# Patient Record
Sex: FEMALE | Race: WHITE | NOT HISPANIC OR LATINO
[De-identification: names, ages, dates, MRNs, and addresses within clinical notes are randomized per-mention and may not be internally consistent; named-entity substitution may affect disease eponyms.]

---

## 2018-08-22 ENCOUNTER — RX ONLY (OUTPATIENT)
Age: 43
Setting detail: RX ONLY
End: 2018-08-22

## 2019-04-10 ENCOUNTER — RX ONLY (OUTPATIENT)
Age: 44
Setting detail: RX ONLY
End: 2019-04-10

## 2020-07-24 ENCOUNTER — RX ONLY (OUTPATIENT)
Age: 45
Setting detail: RX ONLY
End: 2020-07-24

## 2020-10-07 ENCOUNTER — RX ONLY (OUTPATIENT)
Age: 45
Setting detail: RX ONLY
End: 2020-10-07

## 2020-12-11 ENCOUNTER — RX ONLY (OUTPATIENT)
Age: 45
Setting detail: RX ONLY
End: 2020-12-11

## 2021-03-17 ENCOUNTER — RX ONLY (OUTPATIENT)
Age: 46
Setting detail: RX ONLY
End: 2021-03-17

## 2021-05-21 ENCOUNTER — RX ONLY (OUTPATIENT)
Age: 46
Setting detail: RX ONLY
End: 2021-05-21

## 2021-07-06 ENCOUNTER — RX ONLY (OUTPATIENT)
Age: 46
Setting detail: RX ONLY
End: 2021-07-06

## 2021-07-23 ENCOUNTER — ERX REFILL RESPONSE (OUTPATIENT)
Dept: URBAN - METROPOLITAN AREA CLINIC 92 | Facility: CLINIC | Age: 46
End: 2021-07-23

## 2021-07-23 ENCOUNTER — RX ONLY (OUTPATIENT)
Age: 46
Setting detail: RX ONLY
End: 2021-07-23

## 2021-07-23 RX ORDER — OMEPRAZOLE 40 MG/1
TAKE ONE CAPSULE BY MOUTH EVERY MORNING 30 MINUTES BEFORE THE FIRST MEAL OF THE DAY CAPSULE, DELAYED RELEASE ORAL
Qty: 90 CAPSULE | Refills: 3 | OUTPATIENT

## 2021-07-30 ENCOUNTER — ERX REFILL RESPONSE (OUTPATIENT)
Dept: URBAN - METROPOLITAN AREA CLINIC 92 | Facility: CLINIC | Age: 46
End: 2021-07-30

## 2021-07-30 RX ORDER — FAMOTIDINE 20 MG/1
TAKE 2 TABLETS BY MOUTH TWICE DAILY TABLET, FILM COATED ORAL
Qty: 360 TABLET | Refills: 3 | OUTPATIENT

## 2021-08-26 ENCOUNTER — APPOINTMENT (RX ONLY)
Dept: URBAN - METROPOLITAN AREA OTHER 10 | Facility: OTHER | Age: 46
Setting detail: DERMATOLOGY
End: 2021-08-26

## 2021-08-26 DIAGNOSIS — K13.0 DISEASES OF LIPS: ICD-10-CM | Status: INADEQUATELY CONTROLLED

## 2021-08-26 PROCEDURE — ? PRESCRIPTION

## 2021-08-26 PROCEDURE — ? PRESCRIPTION MEDICATION MANAGEMENT

## 2021-08-26 PROCEDURE — ? COUNSELING

## 2021-08-26 PROCEDURE — 99204 OFFICE O/P NEW MOD 45 MIN: CPT

## 2021-08-26 RX ORDER — TRIAMCINOLONE ACETONIDE 0.25 MG/G
CREAM TOPICAL
Qty: 80 | Refills: 2 | Status: ERX | COMMUNITY
Start: 2021-08-26

## 2021-08-26 RX ORDER — KETOCONAZOLE 20 MG/G
CREAM TOPICAL
Qty: 30 | Refills: 2 | Status: ERX | COMMUNITY
Start: 2021-08-26

## 2021-08-26 RX ADMIN — TRIAMCINOLONE ACETONIDE: 0.25 CREAM TOPICAL at 00:00

## 2021-08-26 RX ADMIN — KETOCONAZOLE: 20 CREAM TOPICAL at 00:00

## 2021-08-26 ASSESSMENT — LOCATION ZONE DERM: LOCATION ZONE: LIP

## 2021-08-26 ASSESSMENT — LOCATION SIMPLE DESCRIPTION DERM
LOCATION SIMPLE: RIGHT LIP
LOCATION SIMPLE: LEFT LIP

## 2021-08-26 ASSESSMENT — SEVERITY ASSESSMENT: SEVERITY: MILD TO MODERATE

## 2021-08-26 ASSESSMENT — LOCATION DETAILED DESCRIPTION DERM
LOCATION DETAILED: LEFT INFERIOR VERMILION LIP
LOCATION DETAILED: RIGHT INFERIOR VERMILION LIP

## 2021-08-26 NOTE — PROCEDURE: PRESCRIPTION MEDICATION MANAGEMENT
Render In Strict Bullet Format?: No
Detail Level: Zone
Initiate Treatment: Ketoconazole, mix with Triamcinolone, apply to lips once daily for two weeks, then decrease to apply only on the weekends

## 2021-08-27 ENCOUNTER — APPOINTMENT (RX ONLY)
Dept: URBAN - METROPOLITAN AREA OTHER 10 | Facility: OTHER | Age: 46
Setting detail: DERMATOLOGY
End: 2021-08-27

## 2021-08-27 DIAGNOSIS — K13.0 DISEASES OF LIPS: ICD-10-CM

## 2021-08-27 PROCEDURE — ? OTHER

## 2021-08-27 PROCEDURE — ? REFERRAL

## 2021-08-27 NOTE — PROCEDURE: OTHER
Note Text (......Xxx Chief Complaint.): This diagnosis correlates with the
Detail Level: Zone
Render Risk Assessment In Note?: no
Other (Free Text): Patient was Referred 8-20-21 by Dr. Fung for Angular cheilitis\\nPatient was seen by Dr. Gann\\nCCD will be sent to PCP

## 2022-05-08 ENCOUNTER — TELEPHONE ENCOUNTER (OUTPATIENT)
Dept: URBAN - METROPOLITAN AREA CLINIC 92 | Facility: CLINIC | Age: 47
End: 2022-05-08

## 2022-05-08 ENCOUNTER — ERX REFILL RESPONSE (OUTPATIENT)
Dept: URBAN - METROPOLITAN AREA CLINIC 92 | Facility: CLINIC | Age: 47
End: 2022-05-08

## 2022-05-08 RX ORDER — OMEPRAZOLE 40 MG/1
TAKE ONE CAPSULE BY MOUTH EVERY MORNING 30 MINUTES BEFORE FIRST MEAL OF THE DAY CAPSULE, DELAYED RELEASE ORAL
Qty: 90 CAPSULE | Refills: 1 | OUTPATIENT

## 2022-05-08 RX ORDER — OMEPRAZOLE 40 MG/1
TAKE ONE CAPSULE BY MOUTH EVERY MORNING 30 MINUTES BEFORE THE FIRST MEAL OF THE DAY CAPSULE, DELAYED RELEASE ORAL
Qty: 90 CAPSULE | Refills: 3 | OUTPATIENT

## 2022-06-06 PROBLEM — 235595009 GASTROESOPHAGEAL REFLUX DISEASE: Status: ACTIVE | Noted: 2022-06-06

## 2022-06-06 PROBLEM — 10743008 IRRITABLE BOWEL SYNDROME: Status: ACTIVE | Noted: 2022-06-06

## 2022-06-09 ENCOUNTER — OFFICE VISIT (OUTPATIENT)
Dept: URBAN - METROPOLITAN AREA TELEHEALTH 2 | Facility: TELEHEALTH | Age: 47
End: 2022-06-09
Payer: COMMERCIAL

## 2022-06-09 VITALS — WEIGHT: 255 LBS

## 2022-06-09 DIAGNOSIS — R16.1 SPLENOMEGALY: ICD-10-CM

## 2022-06-09 DIAGNOSIS — K52.9 FOCAL ACTIVE COLITIS: ICD-10-CM

## 2022-06-09 DIAGNOSIS — K21.9 GERD (GASTROESOPHAGEAL REFLUX DISEASE): ICD-10-CM

## 2022-06-09 DIAGNOSIS — K58.9 IBS (IRRITABLE BOWEL SYNDROME): ICD-10-CM

## 2022-06-09 PROCEDURE — 99214 OFFICE O/P EST MOD 30 MIN: CPT | Performed by: INTERNAL MEDICINE

## 2022-06-09 RX ORDER — FAMOTIDINE 20 MG/1
TAKE 2 TABLETS BY MOUTH TWICE DAILY TABLET, FILM COATED ORAL TWICE A DAY
Qty: 360 | Refills: 3

## 2022-06-09 RX ORDER — VILAZODONE HYDROCHLORIDE 40 MG/1
TAKE 1 TABLET (40 MG) BY ORAL ROUTE ONCE DAILY WITH FOOD TABLET ORAL 1
Qty: 0 | Refills: 0 | COMMUNITY
Start: 1900-01-01

## 2022-06-09 RX ORDER — MELOXICAM 15 MG/1
TAKE 1 TABLET (15 MG) BY ORAL ROUTE ONCE DAILY TABLET ORAL 1
Qty: 0 | Refills: 0 | COMMUNITY
Start: 1900-01-01

## 2022-06-09 RX ORDER — TRAZODONE HYDROCHLORIDE 50 MG/1
TABLET ORAL
Qty: 0 | Refills: 0 | COMMUNITY
Start: 1900-01-01

## 2022-06-09 RX ORDER — FAMOTIDINE 20 MG/1
TAKE 2 TABLETS BY MOUTH TWICE DAILY TABLET, FILM COATED ORAL
Qty: 360 TABLET | Refills: 3 | COMMUNITY

## 2022-06-09 RX ORDER — LEVOTHYROXINE SODIUM 125 UG/1
TABLET ORAL
Qty: 0 | Refills: 0 | COMMUNITY
Start: 1900-01-01

## 2022-06-09 RX ORDER — OMEPRAZOLE 40 MG/1
TAKE ONE CAPSULE BY MOUTH EVERY MORNING 30 MINUTES BEFORE FIRST MEAL OF THE DAY CAPSULE, DELAYED RELEASE ORAL ONCE A DAY
Qty: 90 | Refills: 3

## 2022-06-09 RX ORDER — OMEPRAZOLE 40 MG/1
TAKE ONE CAPSULE BY MOUTH EVERY MORNING 30 MINUTES BEFORE FIRST MEAL OF THE DAY CAPSULE, DELAYED RELEASE ORAL
Qty: 90 CAPSULE | Refills: 1 | COMMUNITY

## 2022-06-09 RX ORDER — FLUOXETINE HYDROCHLORIDE 40 MG/1
TAKE 1 CAPSULE (40 MG) BY ORAL ROUTE ONCE DAILY CAPSULE ORAL 1
Qty: 0 | Refills: 0 | COMMUNITY
Start: 1900-01-01

## 2022-06-09 RX ORDER — DIVALPROEX SODIUM 500 MG/1
TAKE 1 TABLET BY ORAL ROUTE 2 TIMES A DAY TABLET, DELAYED RELEASE ORAL 2
Qty: 0 | Refills: 0 | COMMUNITY
Start: 1900-01-01

## 2022-06-09 RX ORDER — PREGABALIN 150 MG
CAPSULE ORAL
Qty: 0 | Refills: 0 | COMMUNITY
Start: 1900-01-01

## 2022-06-09 NOTE — HPI-TODAY'S VISIT:
wt incr 25# over 2y (was 230)   h/o GERD on PPI   Denies abd pain N/V diarrhea constipation hematochezia melena jaundice unintentional wt loss   Denies dysphagia odynophagia food impaction CP cough anorexia light headedness   Denies scleral icterus, increased abd girth, LE edema, confusion, disorientation, memory loss, hematemesis  only has syx (dyspepsia htbrn ) if she misses a dose  Prior EGD/colon DR. CARVER 2019 GERD focal active colitis

## 2024-03-26 ENCOUNTER — LAB (OUTPATIENT)
Dept: URBAN - METROPOLITAN AREA CLINIC 74 | Facility: CLINIC | Age: 49
End: 2024-03-26

## 2024-03-26 ENCOUNTER — OV EP (OUTPATIENT)
Dept: URBAN - METROPOLITAN AREA CLINIC 74 | Facility: CLINIC | Age: 49
End: 2024-03-26
Payer: COMMERCIAL

## 2024-03-26 VITALS
HEIGHT: 66 IN | BODY MASS INDEX: 42.23 KG/M2 | DIASTOLIC BLOOD PRESSURE: 84 MMHG | HEART RATE: 80 BPM | SYSTOLIC BLOOD PRESSURE: 126 MMHG | TEMPERATURE: 97.3 F | WEIGHT: 262.8 LBS | OXYGEN SATURATION: 93 %

## 2024-03-26 DIAGNOSIS — K21.9 GERD (GASTROESOPHAGEAL REFLUX DISEASE): ICD-10-CM

## 2024-03-26 DIAGNOSIS — R16.1 SPLENOMEGALY: ICD-10-CM

## 2024-03-26 DIAGNOSIS — K58.9 IBS (IRRITABLE BOWEL SYNDROME): ICD-10-CM

## 2024-03-26 DIAGNOSIS — A05.8 E. COLI COLITIS: ICD-10-CM

## 2024-03-26 DIAGNOSIS — K64.0 GRADE I INTERNAL HEMORRHOIDS: ICD-10-CM

## 2024-03-26 DIAGNOSIS — Z12.11 SCREENING FOR COLON CANCER: ICD-10-CM

## 2024-03-26 PROCEDURE — 99204 OFFICE O/P NEW MOD 45 MIN: CPT | Performed by: INTERNAL MEDICINE

## 2024-03-26 RX ORDER — LITHIUM 8 MEQ/5ML
5 ML AT BEDTIME SOLUTION ORAL ONCE A DAY
Status: ACTIVE | COMMUNITY

## 2024-03-26 RX ORDER — OMEPRAZOLE 40 MG/1
TAKE ONE CAPSULE BY MOUTH EVERY MORNING 30 MINUTES BEFORE FIRST MEAL OF THE DAY CAPSULE, DELAYED RELEASE ORAL ONCE A DAY
Qty: 90 | Refills: 3 | Status: ACTIVE | COMMUNITY

## 2024-03-26 RX ORDER — FAMOTIDINE 40 MG/1
1 TABLET TABLET, FILM COATED ORAL ONCE A DAY
Qty: 90 TABLET | Refills: 3

## 2024-03-26 RX ORDER — FLUOXETINE HYDROCHLORIDE 40 MG/1
TAKE 1 CAPSULE (40 MG) BY ORAL ROUTE ONCE DAILY CAPSULE ORAL 1
Qty: 0 | Refills: 0 | Status: ON HOLD | COMMUNITY
Start: 1900-01-01

## 2024-03-26 RX ORDER — BUPROPION HCL 100 %
AS DIRECTED POWDER (GRAM) MISCELLANEOUS
Status: ACTIVE | COMMUNITY

## 2024-03-26 RX ORDER — PREGABALIN 150 MG
CAPSULE ORAL
Qty: 0 | Refills: 0 | Status: ACTIVE | COMMUNITY
Start: 1900-01-01

## 2024-03-26 RX ORDER — VILAZODONE HYDROCHLORIDE 40 MG/1
TAKE 1 TABLET (40 MG) BY ORAL ROUTE ONCE DAILY WITH FOOD TABLET ORAL 1
Qty: 0 | Refills: 0 | Status: ACTIVE | COMMUNITY
Start: 1900-01-01

## 2024-03-26 RX ORDER — OMEPRAZOLE 40 MG/1
TAKE ONE CAPSULE BY MOUTH EVERY MORNING 30 MINUTES BEFORE FIRST MEAL OF THE DAY CAPSULE, DELAYED RELEASE ORAL ONCE A DAY
Qty: 90 | Refills: 3

## 2024-03-26 RX ORDER — MELOXICAM 15 MG/1
TAKE 1 TABLET (15 MG) BY ORAL ROUTE ONCE DAILY TABLET ORAL 1
Qty: 0 | Refills: 0 | Status: ON HOLD | COMMUNITY
Start: 1900-01-01

## 2024-03-26 RX ORDER — FAMOTIDINE 20 MG/1
TAKE 2 TABLETS BY MOUTH TWICE DAILY TABLET, FILM COATED ORAL TWICE A DAY
Qty: 360 | Refills: 3 | Status: ACTIVE | COMMUNITY

## 2024-03-26 RX ORDER — DIVALPROEX SODIUM 500 MG/1
TAKE 1 TABLET BY ORAL ROUTE 2 TIMES A DAY TABLET, DELAYED RELEASE ORAL 2
Qty: 0 | Refills: 0 | Status: ACTIVE | COMMUNITY
Start: 1900-01-01

## 2024-03-26 RX ORDER — LEVOTHYROXINE SODIUM 150 UG/1
1 TABLET IN THE MORNING ON AN EMPTY STOMACH TABLET ORAL ONCE A DAY
Refills: 0 | Status: ACTIVE | COMMUNITY
Start: 1900-01-01

## 2024-03-26 RX ORDER — TRAZODONE HYDROCHLORIDE 50 MG/1
TABLET ORAL
Qty: 0 | Refills: 0 | Status: ON HOLD | COMMUNITY
Start: 1900-01-01

## 2024-03-26 NOTE — HPI-TODAY'S VISIT:
Kristen is a 48-year-old CF with a complex medical history, presents for a routine consultation. They report a history of gastroesophageal reflux disease (GERD), irritable bowel syndrome (IBS), fibromyalgia, Sjogren's syndrome, and bipolar disorder. The patient is seeking medication refills. Needs screening colonoscopy The patient describes their reflux as being well-controlled with a regimen of daily Omeprazole and twice-daily famotidine. However, they express concerns about the long-term use of these medications. The patient's bowel habits have recently included an episode of constipation, which resulted in the development of an external hemorrhoid. Although the hemorrhoid was bleeding, it has since ceased. To manage their bowel movements, the patient utilizes stool softeners and maintains adequate hydration, expressing willingness to consider Miralax in the future. Regarding previous diagnostic procedures, the patient underwent a colonoscopy five years prior due to abdominal pain, which revealed internal hemorrhoids. They have never been found to have polyps in their colon.

## 2024-04-18 ENCOUNTER — LAB (OUTPATIENT)
Dept: URBAN - METROPOLITAN AREA CLINIC 4 | Facility: CLINIC | Age: 49
End: 2024-04-18
Payer: COMMERCIAL

## 2024-04-18 ENCOUNTER — COLON (OUTPATIENT)
Dept: URBAN - METROPOLITAN AREA SURGERY CENTER 30 | Facility: SURGERY CENTER | Age: 49
End: 2024-04-18
Payer: COMMERCIAL

## 2024-04-18 DIAGNOSIS — D12.2 ADENOMA OF ASCENDING COLON: ICD-10-CM

## 2024-04-18 DIAGNOSIS — D12.2 BENIGN NEOPLASM OF ASCENDING COLON: ICD-10-CM

## 2024-04-18 DIAGNOSIS — Z12.11 COLON CANCER SCREENING: ICD-10-CM

## 2024-04-18 PROCEDURE — 45380 COLONOSCOPY AND BIOPSY: CPT | Performed by: INTERNAL MEDICINE

## 2024-04-18 PROCEDURE — 88305 TISSUE EXAM BY PATHOLOGIST: CPT | Performed by: PATHOLOGY

## 2024-05-02 ENCOUNTER — WEB ENCOUNTER (OUTPATIENT)
Dept: URBAN - METROPOLITAN AREA CLINIC 74 | Facility: CLINIC | Age: 49
End: 2024-05-02

## 2024-05-02 RX ORDER — FAMOTIDINE 40 MG/1
1 TABLET TABLET, FILM COATED ORAL ONCE A DAY
Qty: 90 TABLET | Refills: 3

## 2024-05-02 RX ORDER — OMEPRAZOLE 40 MG/1
TAKE ONE CAPSULE BY MOUTH EVERY MORNING 30 MINUTES BEFORE FIRST MEAL OF THE DAY CAPSULE, DELAYED RELEASE ORAL ONCE A DAY
Qty: 90 | Refills: 3

## 2024-07-18 ENCOUNTER — DASHBOARD ENCOUNTERS (OUTPATIENT)
Age: 49
End: 2024-07-18

## 2024-07-22 ENCOUNTER — OFFICE VISIT (OUTPATIENT)
Dept: URBAN - METROPOLITAN AREA CLINIC 74 | Facility: CLINIC | Age: 49
End: 2024-07-22

## 2024-07-22 RX ORDER — PREGABALIN 150 MG
CAPSULE ORAL
Qty: 0 | Refills: 0 | Status: ACTIVE | COMMUNITY
Start: 1900-01-01

## 2024-07-22 RX ORDER — OMEPRAZOLE 40 MG/1
TAKE ONE CAPSULE BY MOUTH EVERY MORNING 30 MINUTES BEFORE FIRST MEAL OF THE DAY CAPSULE, DELAYED RELEASE ORAL ONCE A DAY
Qty: 90 | Refills: 3

## 2024-07-22 RX ORDER — BUPROPION HCL 100 %
AS DIRECTED POWDER (GRAM) MISCELLANEOUS
Status: ACTIVE | COMMUNITY

## 2024-07-22 RX ORDER — LITHIUM 8 MEQ/5ML
5 ML AT BEDTIME SOLUTION ORAL ONCE A DAY
Status: ACTIVE | COMMUNITY

## 2024-07-22 RX ORDER — LEVOTHYROXINE SODIUM 150 UG/1
1 TABLET IN THE MORNING ON AN EMPTY STOMACH TABLET ORAL ONCE A DAY
Refills: 0 | Status: ACTIVE | COMMUNITY
Start: 1900-01-01

## 2024-07-22 RX ORDER — VILAZODONE HYDROCHLORIDE 40 MG/1
TAKE 1 TABLET (40 MG) BY ORAL ROUTE ONCE DAILY WITH FOOD TABLET ORAL 1
Qty: 0 | Refills: 0 | Status: ACTIVE | COMMUNITY
Start: 1900-01-01

## 2024-07-22 RX ORDER — OMEPRAZOLE 40 MG/1
TAKE ONE CAPSULE BY MOUTH EVERY MORNING 30 MINUTES BEFORE FIRST MEAL OF THE DAY CAPSULE, DELAYED RELEASE ORAL ONCE A DAY
Qty: 90 | Refills: 3 | Status: ACTIVE | COMMUNITY

## 2024-07-22 RX ORDER — FAMOTIDINE 40 MG/1
1 TABLET TABLET, FILM COATED ORAL ONCE A DAY
Qty: 90 TABLET | Refills: 3

## 2024-07-22 RX ORDER — DIVALPROEX SODIUM 500 MG/1
TAKE 1 TABLET BY ORAL ROUTE 2 TIMES A DAY TABLET, DELAYED RELEASE ORAL 2
Qty: 0 | Refills: 0 | Status: ACTIVE | COMMUNITY
Start: 1900-01-01

## 2024-07-22 RX ORDER — FAMOTIDINE 40 MG/1
1 TABLET TABLET, FILM COATED ORAL ONCE A DAY
Qty: 90 TABLET | Refills: 3 | Status: ACTIVE | COMMUNITY

## 2024-08-13 ENCOUNTER — OFFICE VISIT (OUTPATIENT)
Dept: URBAN - METROPOLITAN AREA CLINIC 74 | Facility: CLINIC | Age: 49
End: 2024-08-13
Payer: COMMERCIAL

## 2024-08-13 VITALS
DIASTOLIC BLOOD PRESSURE: 78 MMHG | TEMPERATURE: 97.5 F | BODY MASS INDEX: 43.04 KG/M2 | OXYGEN SATURATION: 97 % | WEIGHT: 267.8 LBS | HEIGHT: 66 IN | HEART RATE: 75 BPM | SYSTOLIC BLOOD PRESSURE: 110 MMHG

## 2024-08-13 DIAGNOSIS — K58.8 OTHER IRRITABLE BOWEL SYNDROME: ICD-10-CM

## 2024-08-13 DIAGNOSIS — K21.9 GERD (GASTROESOPHAGEAL REFLUX DISEASE): ICD-10-CM

## 2024-08-13 PROCEDURE — 99214 OFFICE O/P EST MOD 30 MIN: CPT | Performed by: INTERNAL MEDICINE

## 2024-08-13 RX ORDER — LEVOTHYROXINE SODIUM 150 UG/1
1 TABLET IN THE MORNING ON AN EMPTY STOMACH TABLET ORAL ONCE A DAY
Refills: 0 | Status: ACTIVE | COMMUNITY
Start: 1900-01-01

## 2024-08-13 RX ORDER — OMEPRAZOLE 40 MG/1
TAKE ONE CAPSULE BY MOUTH EVERY MORNING 30 MINUTES BEFORE FIRST MEAL OF THE DAY CAPSULE, DELAYED RELEASE ORAL ONCE A DAY
Qty: 90 | Refills: 3

## 2024-08-13 RX ORDER — VILAZODONE HYDROCHLORIDE 40 MG/1
TAKE 1 TABLET (40 MG) BY ORAL ROUTE ONCE DAILY WITH FOOD TABLET ORAL 1
Qty: 0 | Refills: 0 | Status: ACTIVE | COMMUNITY
Start: 1900-01-01

## 2024-08-13 RX ORDER — FAMOTIDINE 40 MG/1
1 TABLET TABLET, FILM COATED ORAL ONCE A DAY
Qty: 90 TABLET | Refills: 3 | Status: ACTIVE | COMMUNITY

## 2024-08-13 RX ORDER — OMEPRAZOLE 40 MG/1
TAKE ONE CAPSULE BY MOUTH EVERY MORNING 30 MINUTES BEFORE FIRST MEAL OF THE DAY CAPSULE, DELAYED RELEASE ORAL ONCE A DAY
Qty: 90 | Refills: 3 | Status: ACTIVE | COMMUNITY

## 2024-08-13 RX ORDER — LITHIUM 8 MEQ/5ML
5 ML AT BEDTIME SOLUTION ORAL ONCE A DAY
Status: ACTIVE | COMMUNITY

## 2024-08-13 RX ORDER — FAMOTIDINE 40 MG/1
1 TABLET TABLET, FILM COATED ORAL ONCE A DAY
Qty: 90 TABLET | Refills: 3

## 2024-08-13 RX ORDER — DIVALPROEX SODIUM 500 MG/1
TAKE 1 TABLET BY ORAL ROUTE 2 TIMES A DAY TABLET, DELAYED RELEASE ORAL 2
Qty: 0 | Refills: 0 | Status: ACTIVE | COMMUNITY
Start: 1900-01-01

## 2024-08-13 RX ORDER — BUPROPION HCL 100 %
AS DIRECTED POWDER (GRAM) MISCELLANEOUS
Status: ACTIVE | COMMUNITY

## 2024-08-13 RX ORDER — PREGABALIN 150 MG
CAPSULE ORAL
Qty: 0 | Refills: 0 | Status: ACTIVE | COMMUNITY
Start: 1900-01-01

## 2024-08-13 NOTE — HPI-TODAY'S VISIT:
Th is a 49-year-old CF with a complex medical history, presents for f/u. They report a history of gastroesophageal reflux disease (GERD), irritable bowel syndrome (IBS), fibromyalgia, Sjogren's syndrome, and bipolar disorder. The patient is seeking medication refills. recent colonosopy from 4/24-small tubular adenoma/diverticulosis/internal hemorrhoids The patient describes their reflux as being well-controlled with a regimen of daily Omeprazole and twice-daily famotidine. has chronic constipation that is unresponsive to senna/dulcolax/miralax.

## 2024-10-31 PROBLEM — 440630006: Status: ACTIVE | Noted: 2024-10-31

## 2024-10-31 PROBLEM — 82934008: Status: ACTIVE | Noted: 2024-10-31

## 2024-11-13 ENCOUNTER — LAB OUTSIDE AN ENCOUNTER (OUTPATIENT)
Dept: URBAN - METROPOLITAN AREA CLINIC 74 | Facility: CLINIC | Age: 49
End: 2024-11-13

## 2024-11-13 ENCOUNTER — OFFICE VISIT (OUTPATIENT)
Dept: URBAN - METROPOLITAN AREA CLINIC 74 | Facility: CLINIC | Age: 49
End: 2024-11-13
Payer: COMMERCIAL

## 2024-11-13 VITALS
OXYGEN SATURATION: 99 % | TEMPERATURE: 98.7 F | HEART RATE: 72 BPM | BODY MASS INDEX: 43.3 KG/M2 | DIASTOLIC BLOOD PRESSURE: 72 MMHG | SYSTOLIC BLOOD PRESSURE: 102 MMHG | WEIGHT: 269.4 LBS | HEIGHT: 66 IN

## 2024-11-13 DIAGNOSIS — K57.30 DIVERTICULOSIS OF COLON WITHOUT DIVERTICULITIS: ICD-10-CM

## 2024-11-13 DIAGNOSIS — R10.84 DIFFUSE ABDOMINAL PAIN: ICD-10-CM

## 2024-11-13 DIAGNOSIS — K58.1 IRRITABLE BOWEL SYNDROME WITH CONSTIPATION: ICD-10-CM

## 2024-11-13 DIAGNOSIS — D12.6 TUBULAR ADENOMA OF COLON: ICD-10-CM

## 2024-11-13 DIAGNOSIS — K59.04 CHRONIC IDIOPATHIC CONSTIPATION: ICD-10-CM

## 2024-11-13 DIAGNOSIS — K29.50 MILD CHRONIC GASTRITIS: ICD-10-CM

## 2024-11-13 DIAGNOSIS — K21.9 CHRONIC GERD WITHOUT ESOPHAGITIS: ICD-10-CM

## 2024-11-13 PROBLEM — 8493009: Status: ACTIVE | Noted: 2024-11-13

## 2024-11-13 PROBLEM — 398311004: Status: ACTIVE | Noted: 2024-11-13

## 2024-11-13 PROCEDURE — 99214 OFFICE O/P EST MOD 30 MIN: CPT | Performed by: PHYSICIAN ASSISTANT

## 2024-11-13 RX ORDER — DIVALPROEX SODIUM 500 MG/1
TAKE 1 TABLET BY ORAL ROUTE 2 TIMES A DAY TABLET, DELAYED RELEASE ORAL 2
Qty: 0 | Refills: 0 | Status: ON HOLD | COMMUNITY
Start: 1900-01-01

## 2024-11-13 RX ORDER — OMEPRAZOLE 40 MG/1
TAKE ONE CAPSULE BY MOUTH EVERY MORNING 30 MINUTES BEFORE FIRST MEAL OF THE DAY CAPSULE, DELAYED RELEASE ORAL ONCE A DAY
Qty: 90 | Refills: 3 | Status: ACTIVE | COMMUNITY

## 2024-11-13 RX ORDER — LITHIUM 8 MEQ/5ML
5 ML AT BEDTIME SOLUTION ORAL ONCE A DAY
Status: ACTIVE | COMMUNITY

## 2024-11-13 RX ORDER — BUPROPION HCL 100 %
AS DIRECTED POWDER (GRAM) MISCELLANEOUS
Status: ACTIVE | COMMUNITY

## 2024-11-13 RX ORDER — VILAZODONE HYDROCHLORIDE 40 MG/1
TAKE 1 TABLET (40 MG) BY ORAL ROUTE ONCE DAILY WITH FOOD TABLET ORAL 1
Qty: 0 | Refills: 0 | Status: ACTIVE | COMMUNITY
Start: 1900-01-01

## 2024-11-13 RX ORDER — FAMOTIDINE 40 MG/1
1 TABLET TABLET, FILM COATED ORAL ONCE A DAY
Qty: 90 TABLET | Refills: 3 | Status: ACTIVE | COMMUNITY

## 2024-11-13 RX ORDER — LEVOTHYROXINE SODIUM 150 UG/1
1 TABLET IN THE MORNING ON AN EMPTY STOMACH TABLET ORAL ONCE A DAY
Refills: 0 | Status: ACTIVE | COMMUNITY
Start: 1900-01-01

## 2024-11-13 RX ORDER — PREGABALIN 150 MG
CAPSULE ORAL
Qty: 0 | Refills: 0 | Status: ON HOLD | COMMUNITY
Start: 1900-01-01

## 2024-11-13 NOTE — HPI-TODAY'S VISIT:
The patient is a 49-year-old female with past medical history as noted below known to Dr. Weir is presenting to our clinic today for follow-up appointment with known history of constipation and GERD.  She was given sample of Linzess 72 mcg last visit for constipation.  She is currently on Omeprazole 40 mg 1 tablet in a.m. and Famotidine 40 mg 1 tablet at bedtime for history of GERD. She is moving her bowels once per week. She has also been trying Miralax with poor response.   Procedures: -- Colonoscopy with polypectomy on 04/18/2024 by Dr. Weir noted one 6 mm polyp in the ascending colon, removed with cold biopsy forceps.  Resected and retrieved.  Diverticulosis in sigmoid colon and the descending colon.  Non- bleeding internal hemorrhoids.  Repeat colonoscopy in 5 years for surveillance.  Biopsy with tubular adenoma colon polyps.  -- EGD with biopsy on 01/09/2024 by Dr. Kim noted LA grade B reflux esophagitis.  Erythema, eroded and granular mucosa in the gastric body, antrum polyp prepyloric region of the stomach and pylorus.  Mucosal changes in the duodenum.  Biopsy chronic gastritis.  No H.pylori organism.  No intestinal metaplasia.

## 2024-12-12 ENCOUNTER — OFFICE VISIT (OUTPATIENT)
Dept: URBAN - METROPOLITAN AREA CLINIC 74 | Facility: CLINIC | Age: 49
End: 2024-12-12
Payer: COMMERCIAL

## 2024-12-12 VITALS
BODY MASS INDEX: 43.78 KG/M2 | WEIGHT: 272.4 LBS | SYSTOLIC BLOOD PRESSURE: 116 MMHG | HEIGHT: 66 IN | HEART RATE: 80 BPM | DIASTOLIC BLOOD PRESSURE: 80 MMHG | TEMPERATURE: 98.9 F

## 2024-12-12 DIAGNOSIS — K29.50 MILD CHRONIC GASTRITIS: ICD-10-CM

## 2024-12-12 DIAGNOSIS — K58.1 IRRITABLE BOWEL SYNDROME WITH CONSTIPATION: ICD-10-CM

## 2024-12-12 DIAGNOSIS — R10.84 DIFFUSE ABDOMINAL PAIN: ICD-10-CM

## 2024-12-12 DIAGNOSIS — K21.9 CHRONIC GERD WITHOUT ESOPHAGITIS: ICD-10-CM

## 2024-12-12 DIAGNOSIS — K59.04 CHRONIC IDIOPATHIC CONSTIPATION: ICD-10-CM

## 2024-12-12 DIAGNOSIS — K57.30 DIVERTICULOSIS OF COLON WITHOUT DIVERTICULITIS: ICD-10-CM

## 2024-12-12 PROCEDURE — 99213 OFFICE O/P EST LOW 20 MIN: CPT | Performed by: PHYSICIAN ASSISTANT

## 2024-12-12 RX ORDER — PREGABALIN 150 MG
CAPSULE ORAL
Qty: 0 | Refills: 0 | Status: ON HOLD | COMMUNITY
Start: 1900-01-01

## 2024-12-12 RX ORDER — BUPROPION HCL 100 %
AS DIRECTED POWDER (GRAM) MISCELLANEOUS
Status: ACTIVE | COMMUNITY

## 2024-12-12 RX ORDER — VILAZODONE HYDROCHLORIDE 40 MG/1
TAKE 1 TABLET (40 MG) BY ORAL ROUTE ONCE DAILY WITH FOOD TABLET ORAL 1
Qty: 0 | Refills: 0 | Status: ACTIVE | COMMUNITY
Start: 1900-01-01

## 2024-12-12 RX ORDER — LEVOTHYROXINE SODIUM 150 UG/1
1 TABLET IN THE MORNING ON AN EMPTY STOMACH TABLET ORAL ONCE A DAY
Refills: 0 | Status: ACTIVE | COMMUNITY
Start: 1900-01-01

## 2024-12-12 RX ORDER — OMEPRAZOLE 40 MG/1
TAKE ONE CAPSULE BY MOUTH EVERY MORNING 30 MINUTES BEFORE FIRST MEAL OF THE DAY CAPSULE, DELAYED RELEASE ORAL ONCE A DAY
Qty: 90 | Refills: 3 | Status: ACTIVE | COMMUNITY

## 2024-12-12 RX ORDER — LITHIUM 8 MEQ/5ML
5 ML AT BEDTIME SOLUTION ORAL ONCE A DAY
Status: ACTIVE | COMMUNITY

## 2024-12-12 RX ORDER — DIVALPROEX SODIUM 500 MG/1
TAKE 1 TABLET BY ORAL ROUTE 2 TIMES A DAY TABLET, DELAYED RELEASE ORAL 2
Qty: 0 | Refills: 0 | Status: ON HOLD | COMMUNITY
Start: 1900-01-01

## 2024-12-12 RX ORDER — FAMOTIDINE 40 MG/1
1 TABLET TABLET, FILM COATED ORAL ONCE A DAY
Qty: 90 TABLET | Refills: 3 | Status: ACTIVE | COMMUNITY

## 2024-12-12 NOTE — HPI-TODAY'S VISIT:
The patient is a 49-year-old female with past medical history as noted below known to Dr. Weir is presenting to our clinic today for follow-up appointment. She forgot to do her CT scan. She is currently on Omeprazole 40 mg 1 tablet in a.m and Famotidine 40 mg 1 tablet at bedtime for history of GERD. She was given sample of Linzess 290 mcg last visit for constipation. She states that Linzess 290 mcg was not helping with her bowel movements. She has started back on Miralax with bowel movement every 3 days.   Procedures: -- Colonoscopy with polypectomy on 04/18/2024 by Dr. Weir noted one 6 mm polyp in the ascending colon, removed with cold biopsy forceps.  Resected and retrieved.  Diverticulosis in sigmoid colon and the descending colon.  Non- bleeding internal hemorrhoids.  Repeat colonoscopy in 5 years for surveillance.  Biopsy with tubular adenoma colon polyps.  -- EGD with biopsy on 01/09/2024 by Dr. Kim noted LA grade B reflux esophagitis.  Erythema, eroded and granular mucosa in the gastric body, antrum polyp prepyloric region of the stomach and pylorus.  Mucosal changes in the duodenum.  Biopsy chronic gastritis.  No H.pylori organism.  No intestinal metaplasia.

## 2024-12-18 PROBLEM — 197321007: Status: ACTIVE | Noted: 2024-12-18

## 2025-01-16 ENCOUNTER — OFFICE VISIT (OUTPATIENT)
Dept: URBAN - METROPOLITAN AREA CLINIC 74 | Facility: CLINIC | Age: 50
End: 2025-01-16
Payer: COMMERCIAL

## 2025-01-16 VITALS
TEMPERATURE: 99.3 F | BODY MASS INDEX: 44.48 KG/M2 | DIASTOLIC BLOOD PRESSURE: 80 MMHG | SYSTOLIC BLOOD PRESSURE: 102 MMHG | HEART RATE: 94 BPM | WEIGHT: 276.8 LBS | HEIGHT: 66 IN

## 2025-01-16 DIAGNOSIS — K76.0 HEPATIC STEATOSIS: ICD-10-CM

## 2025-01-16 DIAGNOSIS — K59.04 CHRONIC IDIOPATHIC CONSTIPATION: ICD-10-CM

## 2025-01-16 DIAGNOSIS — K21.9 CHRONIC GERD WITHOUT ESOPHAGITIS: ICD-10-CM

## 2025-01-16 DIAGNOSIS — K58.1 IRRITABLE BOWEL SYNDROME WITH CONSTIPATION: ICD-10-CM

## 2025-01-16 DIAGNOSIS — K29.50 MILD CHRONIC GASTRITIS: ICD-10-CM

## 2025-01-16 PROCEDURE — 99214 OFFICE O/P EST MOD 30 MIN: CPT | Performed by: PHYSICIAN ASSISTANT

## 2025-01-16 RX ORDER — FAMOTIDINE 40 MG/1
1 TABLET TABLET, FILM COATED ORAL ONCE A DAY
Qty: 90 TABLET | Refills: 3

## 2025-01-16 RX ORDER — PREGABALIN 150 MG
CAPSULE ORAL
Qty: 0 | Refills: 0 | Status: ON HOLD | COMMUNITY
Start: 1900-01-01

## 2025-01-16 RX ORDER — VILAZODONE HYDROCHLORIDE 40 MG/1
TAKE 1 TABLET (40 MG) BY ORAL ROUTE ONCE DAILY WITH FOOD TABLET ORAL 1
Qty: 0 | Refills: 0 | Status: ACTIVE | COMMUNITY
Start: 1900-01-01

## 2025-01-16 RX ORDER — FAMOTIDINE 40 MG/1
1 TABLET TABLET, FILM COATED ORAL ONCE A DAY
Qty: 90 TABLET | Refills: 3 | Status: ACTIVE | COMMUNITY

## 2025-01-16 RX ORDER — OMEPRAZOLE 40 MG/1
TAKE ONE CAPSULE BY MOUTH EVERY MORNING 30 MINUTES BEFORE FIRST MEAL OF THE DAY CAPSULE, DELAYED RELEASE ORAL ONCE A DAY
Qty: 90 | Refills: 3

## 2025-01-16 RX ORDER — TENAPANOR HYDROCHLORIDE 53.2 MG/1
1 TABLET IMMEDIATELY BEFORE MEALS TABLET ORAL TWICE A DAY
Qty: 180 TABLET | Refills: 3 | OUTPATIENT
Start: 2025-01-16

## 2025-01-16 RX ORDER — OMEPRAZOLE 40 MG/1
TAKE ONE CAPSULE BY MOUTH EVERY MORNING 30 MINUTES BEFORE FIRST MEAL OF THE DAY CAPSULE, DELAYED RELEASE ORAL ONCE A DAY
Qty: 90 | Refills: 3 | Status: ACTIVE | COMMUNITY

## 2025-01-16 RX ORDER — DIVALPROEX SODIUM 500 MG/1
TAKE 1 TABLET BY ORAL ROUTE 2 TIMES A DAY TABLET, DELAYED RELEASE ORAL 2
Qty: 0 | Refills: 0 | Status: ON HOLD | COMMUNITY
Start: 1900-01-01

## 2025-01-16 RX ORDER — LEVOTHYROXINE SODIUM 150 UG/1
1 TABLET IN THE MORNING ON AN EMPTY STOMACH TABLET ORAL ONCE A DAY
Refills: 0 | Status: ACTIVE | COMMUNITY
Start: 1900-01-01

## 2025-01-16 RX ORDER — BUPROPION HCL 100 %
AS DIRECTED POWDER (GRAM) MISCELLANEOUS
Status: ACTIVE | COMMUNITY

## 2025-01-16 RX ORDER — LITHIUM 8 MEQ/5ML
5 ML AT BEDTIME SOLUTION ORAL ONCE A DAY
Status: ACTIVE | COMMUNITY

## 2025-01-16 NOTE — HPI-TODAY'S VISIT:
The patient is a 49-year-old female with past medical history as noted below known to Dr. Weir is presenting to our clinic today for follow-up appointment to discuss her CT reults. She is currently on Omeprazole 40 mg 1 tablet in am and Famotidine 40 mg 1 tablet at bedtime for history of GERD.She failed therapy with Linzess 290 mcg once daily and she was given samples of Ibsrela 50 mg twice daily. She is doing much better on Ibsrela and she moves her bowels.   Diagnostic studies: -- CT scan of the abdomen and pelvis with IV contrast on 12/17/2024 noted moderate to large colonic stool burden, consistent with chronic constipation.  No acute abnormality of the abdomen or pelvis.  Hepatic steatosis.  Gallbladder, spleen, pancreas, adrenal glands, and kidneys are unremarkable.  Procedures: -- Colonoscopy with polypectomy on 04/18/2024 by Dr. Weir noted one 6 mm polyp in the ascending colon, removed with cold biopsy forceps.  Resected and retrieved.  Diverticulosis in sigmoid colon and the descending colon.  Non- bleeding internal hemorrhoids.  Repeat colonoscopy in 5 years for surveillance.  Biopsy with tubular adenoma colon polyps.  -- EGD with biopsy on 01/09/2024 by Dr. Kim noted LA grade B reflux esophagitis.  Erythema, eroded and granular mucosa in the gastric body, antrum polyp prepyloric region of the stomach and pylorus.  Mucosal changes in the duodenum.  Biopsy chronic gastritis.  No H.pylori organism.  No intestinal metaplasia.

## 2025-01-16 NOTE — PHYSICAL EXAM HENT:
Head, normocephalic, atraumatic, Face, Face within normal limits, Ears, External ears within normal limits None needed

## 2025-01-17 ENCOUNTER — TELEPHONE ENCOUNTER (OUTPATIENT)
Dept: URBAN - METROPOLITAN AREA CLINIC 74 | Facility: CLINIC | Age: 50
End: 2025-01-17

## 2025-01-21 ENCOUNTER — TELEPHONE ENCOUNTER (OUTPATIENT)
Dept: URBAN - METROPOLITAN AREA CLINIC 74 | Facility: CLINIC | Age: 50
End: 2025-01-21

## 2025-01-29 LAB
ABSOLUTE BASOPHILS: 67
ABSOLUTE EOSINOPHILS: 489
ABSOLUTE LYMPHOCYTES: 1956
ABSOLUTE MONOCYTES: 348
ABSOLUTE NEUTROPHILS: 3839
ALBUMIN/GLOBULIN RATIO: 1.8
ALBUMIN: 4.2
ALKALINE PHOSPHATASE: 78
ALT: 22
AST: 19
BASOPHILS: 1
BILIRUBIN, TOTAL: 0.4
BUN/CREATININE RATIO: 13
CALCIUM: 9.7
CARBON DIOXIDE: 26
CHLORIDE: 109
CREATININE: 1
EGFR: 69
EOSINOPHILS: 7.3
FIB 4 INDEX: 1.12
FIB 4 INTERPRETATION: (no result)
GLOBULIN: 2.4
GLUCOSE: 92
HEMATOCRIT: 35.4
HEMOGLOBIN: 11.6
LYMPHOCYTES: 29.2
MCH: 27.6
MCHC: 32.8
MCV: 84.1
MONOCYTES: 5.2
MPV: 11.4
NEUTROPHILS: 57.3
PLATELET COUNT: 174
PLATELET COUNT: 174
POTASSIUM: 4.3
PROTEIN, TOTAL: 6.6
RDW: 13
RED BLOOD CELL COUNT: 4.21
SODIUM: 141
UREA NITROGEN (BUN): 13
WHITE BLOOD CELL COUNT: 6.7

## 2025-03-18 ENCOUNTER — OFFICE VISIT (OUTPATIENT)
Dept: URBAN - METROPOLITAN AREA CLINIC 74 | Facility: CLINIC | Age: 50
End: 2025-03-18
Payer: COMMERCIAL

## 2025-03-18 VITALS
HEART RATE: 76 BPM | WEIGHT: 275 LBS | HEIGHT: 66 IN | DIASTOLIC BLOOD PRESSURE: 68 MMHG | OXYGEN SATURATION: 98 % | SYSTOLIC BLOOD PRESSURE: 110 MMHG | BODY MASS INDEX: 44.2 KG/M2

## 2025-03-18 DIAGNOSIS — K76.0 HEPATIC STEATOSIS: ICD-10-CM

## 2025-03-18 DIAGNOSIS — K58.1 IRRITABLE BOWEL SYNDROME WITH CONSTIPATION: ICD-10-CM

## 2025-03-18 DIAGNOSIS — K21.9 CHRONIC GERD WITHOUT ESOPHAGITIS: ICD-10-CM

## 2025-03-18 DIAGNOSIS — K29.50 MILD CHRONIC GASTRITIS: ICD-10-CM

## 2025-03-18 PROCEDURE — 99214 OFFICE O/P EST MOD 30 MIN: CPT | Performed by: PHYSICIAN ASSISTANT

## 2025-03-18 RX ORDER — LITHIUM 8 MEQ/5ML
5 ML AT BEDTIME SOLUTION ORAL ONCE A DAY
Status: ACTIVE | COMMUNITY

## 2025-03-18 RX ORDER — OMEPRAZOLE 40 MG/1
TAKE ONE CAPSULE BY MOUTH EVERY MORNING 30 MINUTES BEFORE FIRST MEAL OF THE DAY CAPSULE, DELAYED RELEASE ORAL ONCE A DAY
Qty: 90 | Refills: 3 | Status: ACTIVE | COMMUNITY

## 2025-03-18 RX ORDER — FAMOTIDINE 40 MG/1
1 TABLET TABLET, FILM COATED ORAL ONCE A DAY
Qty: 90 TABLET | Refills: 3 | Status: ACTIVE | COMMUNITY

## 2025-03-18 RX ORDER — VONOPRAZAN FUMARATE 26.72 MG/1
1 TABLET TABLET ORAL ONCE A DAY
Qty: 90 TABLET | Refills: 3 | OUTPATIENT
Start: 2025-03-18

## 2025-03-18 RX ORDER — BUPROPION HCL 100 %
AS DIRECTED POWDER (GRAM) MISCELLANEOUS
Status: ACTIVE | COMMUNITY

## 2025-03-18 RX ORDER — PREGABALIN 150 MG
CAPSULE ORAL
Qty: 0 | Refills: 0 | Status: ON HOLD | COMMUNITY
Start: 1900-01-01

## 2025-03-18 RX ORDER — DIVALPROEX SODIUM 500 MG/1
TAKE 1 TABLET BY ORAL ROUTE 2 TIMES A DAY TABLET, DELAYED RELEASE ORAL 2
Qty: 0 | Refills: 0 | Status: ON HOLD | COMMUNITY
Start: 1900-01-01

## 2025-03-18 RX ORDER — TENAPANOR HYDROCHLORIDE 53.2 MG/1
1 TABLET IMMEDIATELY BEFORE MEALS TABLET ORAL TWICE A DAY
Qty: 180 TABLET | Refills: 3
Start: 2025-01-16 | End: 2026-03-13

## 2025-03-18 RX ORDER — TENAPANOR HYDROCHLORIDE 53.2 MG/1
1 TABLET IMMEDIATELY BEFORE MEALS TABLET ORAL TWICE A DAY
Qty: 180 TABLET | Refills: 3 | Status: ACTIVE | COMMUNITY
Start: 2025-01-16

## 2025-03-18 RX ORDER — VILAZODONE HYDROCHLORIDE 40 MG/1
TAKE 1 TABLET (40 MG) BY ORAL ROUTE ONCE DAILY WITH FOOD TABLET ORAL 1
Qty: 0 | Refills: 0 | Status: ACTIVE | COMMUNITY
Start: 1900-01-01

## 2025-03-18 RX ORDER — LEVOTHYROXINE SODIUM 150 UG/1
1 TABLET IN THE MORNING ON AN EMPTY STOMACH TABLET ORAL ONCE A DAY
Refills: 0 | Status: ACTIVE | COMMUNITY
Start: 1900-01-01

## 2025-03-18 NOTE — HPI-TODAY'S VISIT:
The patient is a 49-year-old female with past medical history as noted below known to Dr. Weir is presenting to our clinic today for follow-up appointment to discuss her lab reults. She is currently on Omeprazole 40 mg 1 tablet in am and Famotidine 40 mg 1 tablet at bedtime for history of GERD.She has been on Omeprazole and Famotidine over 15 years. She failed therapy with Linzess 72 mcg, 145 mcg, and 290 mcg once daily. She is now on Ibsrela 50 mg twice daily. She is doing much better on Ibsrela and she moves her bowels.   Diagnostic studies: -- Labs on 01/20/2025 CBC with WBC 6.7, hemoglobin 11.6, hematocrit 35.4, and platelet 174.  CMP with BUN 13, creatinine 1.0, ALP 78, AST 19, ALT 22, and TB 0.4. FIB-4 INDEX 1.12 is consistent with low risk.  -- CT scan of the abdomen and pelvis with IV contrast on 12/17/2024 noted moderate to large colonic stool burden, consistent with chronic constipation.  No acute abnormality of the abdomen or pelvis.  Hepatic steatosis.  Gallbladder, spleen, pancreas, adrenal glands, and kidneys are unremarkable.  Procedures: -- Colonoscopy with polypectomy on 04/18/2024 by Dr. Weir noted one 6 mm polyp in the ascending colon, removed with cold biopsy forceps.  Resected and retrieved.  Diverticulosis in sigmoid colon and the descending colon.  Non- bleeding internal hemorrhoids.  Repeat colonoscopy in 5 years for surveillance.  Biopsy with tubular adenoma colon polyps.  -- EGD with biopsy on 01/09/2024 by Dr. Kim noted LA grade B reflux esophagitis.  Erythema, eroded and granular mucosa in the gastric body, antrum polyp prepyloric region of the stomach and pylorus.  Mucosal changes in the duodenum.  Biopsy chronic gastritis.  No H.pylori organism.  No intestinal metaplasia.